# Patient Record
Sex: FEMALE
[De-identification: names, ages, dates, MRNs, and addresses within clinical notes are randomized per-mention and may not be internally consistent; named-entity substitution may affect disease eponyms.]

---

## 2019-02-09 ENCOUNTER — HOSPITAL ENCOUNTER (EMERGENCY)
Dept: HOSPITAL 25 - UCEAST | Age: 19
Discharge: HOME | End: 2019-02-09
Payer: COMMERCIAL

## 2019-02-09 VITALS — SYSTOLIC BLOOD PRESSURE: 108 MMHG | DIASTOLIC BLOOD PRESSURE: 73 MMHG

## 2019-02-09 DIAGNOSIS — Z88.0: ICD-10-CM

## 2019-02-09 DIAGNOSIS — M25.511: Primary | ICD-10-CM

## 2019-02-09 PROCEDURE — 99202 OFFICE O/P NEW SF 15 MIN: CPT

## 2019-02-09 PROCEDURE — G0463 HOSPITAL OUTPT CLINIC VISIT: HCPCS

## 2019-02-09 NOTE — UC
Shoulder Pain HPI





- HPI Summary


HPI Summary: 


PATIENT STATES SHE WAS WOKEN FROM SLEEP LAST NIGHT WITH RIGHT SHOULDER PAIN.  

SHE REPORTS HAVING MULTIPLE EPISODES OF SPONTANEOUS SHOULDER DISLOCATIONS WHILE 

IN HIGH SCHOOL.  MOST RECENT EPISODE WAS 2 YEARS AGO.  STATES HER MOM WOULD 

JUST POP IT BACK INTO PLACE.  SHE THINKS HER SHOULDER IS AGAIN DISLOCATED 

TODAY.  SHE STATES SURGERY WAS RECOMMENDED AT ONE TIME BUT SHE DID NOT FOLLOW-

UP ON THIS.








- History of Current Complaint


Chief Complaint: UCUpperExtremity


Stated Complaint: SHOULDER INJURY


Time Seen by Provider: 02/09/19 17:29


Hx Obtained From: Patient


Hx Last Menstrual Period: Jan 26, 2019


Onset/Duration: Sudden Onset, Lasting Hours, Still Present


Timing: Constant


Severity Initially: Moderate


Severity Currently: Moderate


Location Of Pain: Is Discrete @ - RIGHT SHOULDER


Pain Intensity: 7


Pain Scale Used: 0-10 Numeric


Character: Sharp, Aching


Aggravating Factor(s): Movement


Alleviating Factor(s): Nothing


Associated Signs And Symptoms: Positive: Negative


Related History: Dominant Hand Right





- Allergies/Home Medications


Allergies/Adverse Reactions: 


 Allergies











Allergy/AdvReac Type Severity Reaction Status Date / Time


 


Penicillins Allergy  Difficulty Verified 02/09/19 17:07





   Breathing  











Home Medications: 


 Home Medications





NK [No Home Medications Reported]  02/09/19 [History Confirmed 02/09/19]











PMH/Surg Hx/FS Hx/Imm Hx


Previously Healthy: Yes





- Surgical History


Surgical History: None





- Family History


Known Family History: Positive: Non-Contributory





- Social History


Alcohol Use: Occasionally


Substance Use Type: None


Smoking Status (MU): Never Smoked Tobacco





Review of Systems


All Other Systems Reviewed And Are Negative: Yes


Constitutional: Positive: Negative


Skin: Positive: Negative


Respiratory: Positive: Negative


Cardiovascular: Positive: Negative


Gastrointestinal: Positive: Negative


Musculoskeletal: Positive: Arthralgia, Decreased ROM





Physical Exam


Triage Information Reviewed: Yes


Appearance: Well-Appearing, No Pain Distress, Well-Nourished


Vital Signs: 


 Initial Vital Signs











Temp  98.7 F   02/09/19 17:03


 


Pulse  62   02/09/19 17:03


 


Resp  12   02/09/19 17:03


 


BP  108/73   02/09/19 17:03


 


Pulse Ox  100   02/09/19 17:03











Vital Signs Reviewed: Yes


Eyes: Positive: Conjunctiva Clear


ENT: Positive: Hearing grossly normal


Neck: Positive: Supple


Respiratory: Positive: No respiratory distress, No accessory muscle use


Cardiovascular: Positive: Pulses Normal


Abdomen Description: Positive: Soft


Musculoskeletal: Positive: No Edema, ROM Limited @ - RIGHT SHOULDER ROM LIMITED 

BUT NOT ABSENT, Other: - TTP DIFFUSELY OVER RIGHT SHOULDER BUT MOSTLY OVER 

SCAPULA. NO SHOULDER DISLOCATION.


Neurological: Positive: Alert


Psychological: Positive: Age Appropriate Behavior


Skin: Negative: Rashes





Diagnostics





- Radiology


  ** RIGHT SHOULDER XRAY


Radiology Interpretation Completed By: Radiologist


Summary of Radiographic Findings: FINDINGS SUGGESTIVE OF A MILD HILL-SACHS 

FRACTURE.





Shoulder Course/Dx





- Course


Course Of Treatment: ON EXAMINATION PATIENT'S SHOULDER IS NOT DISLOCATED.  SHE 

STATES THAT WHEN SHE WAS BEING POSITIONED FOR X-RAY SHE HEARD A POP.  IT IS 

POSSIBLE THAT SHE REDUCED HER DISLOCATION AT THIS TIME.  GIVEN HER REPORTED 

HISTORY OF RECURRENT SHOULDER DISLOCATIONS HAVE REFERRED TO ORTHOPEDICS FOR 

FURTHER EVALUATION.  I ALSO RECOMMENDED PHYSICAL THERAPY TO HELP STRENGTHEN THE 

MUSCLES THAT SUPPORT THAT JOINT.  SLING FOR COMFORT.  ADVISED TO DISCUSS 

POSSIBLE CONNECTIVE TISSUE DISORDER WITH HER PCP.


Assessment/Plan: OFFICIAL RADIOLOGY REPORT RETURNED AFTER PT DISCHARGE. 

FINDINGS SUGGESTIVE OF A MILD HILL-SACHS FRACTURE. I CALLED PT AND NOTIFIED HER 

OF THIS FINDING. SHE WILL CALL ORTHO MONDAY MORNING FOR AN APPT AS ADVISED.





- Differential Dx/Diagnosis


Provider Diagnosis: 


 Right shoulder pain








Discharge





- Sign-Out/Discharge


Documenting (check all that apply): Patient Departure


All imaging exams completed and their final reports reviewed: Yes





- Discharge Plan


Condition: Stable


Disposition: HOME


Patient Education Materials:  Shoulder Separation Exercises (GEN), Shoulder 

Pain (ED)


Referrals: 


Lewis Cornejo MD [Medical Doctor] - 1 Week


Additional Instructions: 


AT THE TIME OF EVALUATION YOUR SHOULDER WAS NOT DISLOCATED.  IT'S POSSIBLE THAT 

YOU POPPED IT IN ON YOUR OWN WHILE YOU WERE BEING POSITIONED FOR X-RAY.  WEAR 

THE SLING AS NEEDED FOR COMFORT TO OFFLOAD PRESSURE FROM YOUR SHOULDER JOINT.  

PHYSICAL THERAPY REFERRAL PROVIDED TODAY.  THIS CAN BE QUITE HELPFUL IN 

STRENGTHENING THE MUSCLES THAT STABILIZE YOUR SHOULDER.  GIVEN YOUR HISTORY OF 

RECURRENT SHOULDER DISLOCATIONS I WOULD STRONGLY RECOMMEND YOU FOLLOW-UP WITH 

AN ORTHOPEDIST TO FURTHER DISCUSS TREATMENT.  I WOULD ALSO CONSIDER FOLLOWING 

UP WITH YOUR PRIMARY CARE PHYSICIAN TO DISCUSS EVALUATION FOR A POSSIBLE 

CONNECTIVE TISSUE DISORDER.





GO TO THE EMERGENCY ROOM IF YOU DISLOCATE YOUR SHOULDER AGAIN OR IF YOU DEVELOP 

NUMBNESS, TINGLING OR COLOR CHANGE IN YOUR HAND.








- Billing Disposition and Condition


Condition: STABLE


Disposition: Home

## 2019-02-10 ENCOUNTER — HOSPITAL ENCOUNTER (EMERGENCY)
Dept: HOSPITAL 25 - ED | Age: 19
Discharge: HOME | End: 2019-02-10
Payer: SELF-PAY

## 2019-02-10 VITALS — SYSTOLIC BLOOD PRESSURE: 110 MMHG | DIASTOLIC BLOOD PRESSURE: 66 MMHG

## 2019-02-10 DIAGNOSIS — Z88.0: ICD-10-CM

## 2019-02-10 DIAGNOSIS — S83.105A: Primary | ICD-10-CM
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
## 2019-02-10 PROCEDURE — 99282 EMERGENCY DEPT VISIT SF MDM: CPT

## 2019-02-10 NOTE — ED
Lower Extremity





- HPI Summary


HPI Summary: 


18 year old female presents with left knee dislocation.  She states that it 

took her a half an hour to get the knee back in.  She states is not currently 

dislocated.  She has history of multiple dislocations to her knees and 

shoulder.  She is follow-up with ortho this week as she states she just 

dislocated her shoulder a couple days ago.  She states that when she tries to 

put weight on the knee feels like it give out.  She denies any numbness or 

tingling.





- History of Current Complaint


Chief Complaint: EDExtremityLower


Stated Complaint: LEFT KNEE INJURY


Time Seen by Provider: 02/10/19 23:37


Hx Last Menstrual Period: Jan 26, 2019


Pain Intensity: 7





- Allergies/Home Medications


Allergies/Adverse Reactions: 


 Allergies











Allergy/AdvReac Type Severity Reaction Status Date / Time


 


Penicillins Allergy  Difficulty Verified 02/09/19 17:07





   Breathing  











Home Medications: 


 Home Medications





Naproxen Sodium [Aleve] 2 tab PO Q12HR 02/10/19 [History Confirmed 02/10/19]











PMH/Surg Hx/FS Hx/Imm Hx


Respiratory History: 


   Denies: Hx Asthma


Infectious Disease History: No


Infectious Disease History: 


   Denies: Traveled Outside the [place] in Last 30 Days





- Family History


Known Family History: Positive: Non-Contributory





- Social History


Alcohol Use: Occasionally


Substance Use Type: Reports: None


Smoking Status (MU): Never Smoked Tobacco





Review of Systems


Negative: Fever


Negative: Chest Pain


Negative: Shortness Of Breath


Positive: Myalgia - left knee pain


All Other Systems Reviewed And Are Negative: Yes





Physical Exam


Triage Information Reviewed: Yes


Vital Signs On Initial Exam: 


 Initial Vitals











Temp Pulse Resp BP Pulse Ox


 


 99.0 F   80   16   117/58   95 


 


 02/10/19 21:46  02/10/19 21:46  02/10/19 21:46  02/10/19 21:46  02/10/19 21:46











Vital Signs Reviewed: Yes


Appearance: Positive: Well-Appearing


Skin: Positive: Warm, Dry


Head/Face: Positive: Normal Head/Face Inspection


Eyes: Positive: Normal, Conjunctiva Clear


ENT: Positive: Pharynx normal


Respiratory/Lung Sounds: Positive: Clear to Auscultation, Breath Sounds Present


Cardiovascular: Positive: Normal, RRR


Musculoskeletal: Positive: Strength/ROM Intact - left knee, Other - minimial 

tenderness left patella, good pulses


Neurological: Positive: Normal


Psychiatric: Positive: Normal





Diagnostics





- Vital Signs


 Vital Signs











  Temp Pulse Resp BP Pulse Ox


 


 02/10/19 21:46  99.0 F  80  16  117/58  95














- Laboratory


Lab Statement: Any lab studies that have been ordered have been reviewed, and 

results considered in the medical decision making process.





- Radiology


  ** knee


Radiology Interpretation Completed By: ED Physician


Summary of Radiographic Findings: no fracture





Lower Extremity Course/Dx





- Course


Course Of Treatment: 18 year old female presents with left knee dislocation.  

She states that it took her a half an hour to get the knee back in.  She states 

is not currently dislocated.  She has history of multiple dislocations to her 

knees and shoulder.  She is follow-up with ortho this week as she states she 

just dislocated her shoulder a couple days ago.  She states that when she tries 

to put weight on the knee feels like it give out.  She denies any numbness or 

tingling  On exam tenderness over left patella.  neurovascular intact.  X-ray 

shows no current dislocation.  Gave knee immobilizer.  Told to follow-up with 

orthopedic.  Patient understands agrees with plan.





- Diagnoses


Differential Diagnosis/HQI/PQRI: Positive: Fracture (Closed), Sprain, Strain


Provider Diagnoses: 


 Left knee dislocation








Discharge





- Sign-Out/Discharge


Documenting (check all that apply): Patient Departure


Patient Received Moderate/Deep Sedation with Procedure: No





- Discharge Plan


Condition: Good


Disposition: HOME


Patient Education Materials:  Knee Dislocation (ED)


Referrals: 


No Primary Care Phys,NOPCP [Primary Care Provider] - 


Additional Instructions: 


Use immobilizer as meeded


Ice, elevate, 


Ibuprofen or Tylenol every 6 hours for pain


Follow up with ortho 


Return to ED if develop or any new or worsening symptoms





- Billing Disposition and Condition


Condition: GOOD


Disposition: Home

## 2019-11-04 ENCOUNTER — HOSPITAL ENCOUNTER (EMERGENCY)
Dept: HOSPITAL 25 - UCEAST | Age: 19
Discharge: HOME | End: 2019-11-04
Payer: COMMERCIAL

## 2019-11-04 VITALS — DIASTOLIC BLOOD PRESSURE: 70 MMHG | SYSTOLIC BLOOD PRESSURE: 111 MMHG

## 2019-11-04 DIAGNOSIS — H53.149: ICD-10-CM

## 2019-11-04 DIAGNOSIS — S06.0X0A: ICD-10-CM

## 2019-11-04 DIAGNOSIS — Z88.0: ICD-10-CM
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
## 2019-11-04 DIAGNOSIS — R55: Primary | ICD-10-CM

## 2019-11-04 PROCEDURE — 99202 OFFICE O/P NEW SF 15 MIN: CPT

## 2019-11-04 PROCEDURE — 36415 COLL VENOUS BLD VENIPUNCTURE: CPT

## 2019-11-04 PROCEDURE — 85025 COMPLETE CBC W/AUTO DIFF WBC: CPT

## 2019-11-04 PROCEDURE — G0463 HOSPITAL OUTPT CLINIC VISIT: HCPCS

## 2019-11-04 PROCEDURE — 81003 URINALYSIS AUTO W/O SCOPE: CPT

## 2019-11-04 PROCEDURE — 84702 CHORIONIC GONADOTROPIN TEST: CPT

## 2019-11-04 PROCEDURE — 70450 CT HEAD/BRAIN W/O DYE: CPT

## 2019-11-04 PROCEDURE — 80048 BASIC METABOLIC PNL TOTAL CA: CPT

## 2019-11-04 NOTE — UC
Dizzy HPI


HPI Summary: 





20 yo female got up quickly from toilet bowl this am and passed out stiking her 

left occiput


when she woke up she attempted to get up and passed out again


has a HA (holocranial0


Has photo and phonophobia


no neck pain


no CP/SOB/palpitations





no prior hx of faints





today has felt a little weak and dizzy











- History Of Current Complaint


Chief Complaint: UCHeadInjury


Stated Complaint: HEADACHE FAINTED


Time Seen by Provider: 11/04/19 18:07


Hx Obtained From: Patient


Hx Last Menstrual Period: Jan 26, 2019


Onset/Duration: Sudden Onset, Other


Severity Initially: Severe


Severity Currently: Severe


Pain Intensity: 8


Pain Scale Used: 0-10 Numeric


Character: Lightheaded, Dizzy


Aggravating Factor(s): Nothing


Alleviating Factor(s): Rest


Associated Signs And Symptoms: Positive: Tinnitus - slight, Decreased Oral 

Intake.  Negative: Nausea, Vomiting, Diaphoresis, Chest Pain, SOB, Palpitations

, Unsteady Gait, Visual Changes, Change In Medication, Change In Diet, OTC 

Medications





- Allergies/Home Medications


Allergies/Adverse Reactions: 


 Allergies











Allergy/AdvReac Type Severity Reaction Status Date / Time


 


Penicillins Allergy  Difficulty Verified 02/09/19 17:07





   Breathing  














PMH/Surg Hx/FS Hx/Imm Hx


Previously Healthy: Yes





- Surgical History


Surgical History: None





- Family History


Known Family History: Positive: Non-Contributory





- Social History


Alcohol Use: Occasionally


Substance Use Type: None


Smoking Status (MU): Never Smoked Tobacco





Review of Systems


All Other Systems Reviewed And Are Negative: Yes


Constitutional: Positive: Negative


Skin: Positive: Negative


Eyes: Positive: Photophobia


ENT: Positive: Negative


Respiratory: Positive: Negative


Cardiovascular: Positive: Negative


Gastrointestinal: Positive: Negative


Genitourinary: Positive: Negative


Motor: Positive: Negative


Neurovascular: Positive: Negative


Musculoskeletal: Positive: Negative


Neurological: Positive: Headache


Psychological: Positive: Negative





Physical Exam


Triage Information Reviewed: Yes


Appearance: Well-Appearing, No Pain Distress, Well-Nourished


Vital Signs: 


 Initial Vital Signs











Temp  99.9 F   11/04/19 16:50


 


Pulse  74   11/04/19 16:50


 


Resp  22   11/04/19 16:50


 


BP  117/59   11/04/19 16:50


 


Pulse Ox  100   11/04/19 16:50











Vital Signs Reviewed: Yes


Eyes: Positive: Conjunctiva Clear, Other: - EOMI/PERRL


ENT: Positive: Hearing grossly normal, Uvula midline.  Negative: Nasal 

congestion, Nasal drainage, Tonsillar swelling, Tonsillar exudate, Trismus, 

Muffled voice, Hoarse voice


Dental Exam: Normal


Neck: Positive: Supple, Nontender, No Lymphadenopathy


Respiratory: Positive: Lungs clear, Normal breath sounds, No respiratory 

distress, No accessory muscle use


Cardiovascular: Positive: RRR, No Murmur


Musculoskeletal: Positive: ROM Intact, No Edema


Neurological Exam: Normal


Neurological: Positive: Alert, Other: - cn2-12 intact, GCS 15/15, strength 5/5/

, (-) Rhomberg, normal gait, brisk and symmetrical knee jerks


Psychological Exam: Normal


Skin Exam: Normal





Diagnostics





- Radiology


  ** No standard instances


Radiology Interpretation Completed By: Radiologist


Summary of Radiographic Findings: CT brain: negative





- EKG


Cardiac Rate: NL


Cardiac Rhythm: Sinus: Normal


Ectopy: None


ST Segment: Normal





Re-Evaluation





- Re-Evaluation


  ** First Eval


Re-Evaluation Time: 19:46


Change: Improved - headache decreased-ambulating without difficulty





Dizzy Course/Dx





- Differential Dx/Diagnosis


Provider Diagnosis: 


 Syncope, Concussion








Discharge ED





- Sign-Out/Discharge


Documenting (check all that apply): Patient Departure


All imaging exams completed and their final reports reviewed: Yes





- Discharge Plan


Condition: Stable


Disposition: HOME


Patient Education Materials:  Concussion (ED), Syncope (DC)


Referrals: 


No Primary Care Phys,NOPCP [Primary Care Provider] - 


[facility] [place] PHYSICIAN REFERRAL [Outside] - 1 Week (if not better)


Additional Instructions: 


rest


ice


tylenol 





ekg and CT normal





blood work pending





recheck for new or worsening symptoms











- Billing Disposition and Condition


Condition: STABLE


Disposition: Home

## 2019-11-05 LAB
ANION GAP SERPL CALC-SCNC: 5 MMOL/L (ref 2–11)
BASOPHILS # BLD AUTO: 0 10^3/UL (ref 0–0.2)
BUN SERPL-MCNC: 16 MG/DL (ref 6–24)
BUN/CREAT SERPL: 22.9 (ref 8–20)
CALCIUM SERPL-MCNC: 9.4 MG/DL (ref 8.6–10.3)
CHLORIDE SERPL-SCNC: 105 MMOL/L (ref 101–111)
EOSINOPHIL # BLD AUTO: 0.1 10^3/UL (ref 0–0.6)
GLUCOSE SERPL-MCNC: 83 MG/DL (ref 70–100)
HCO3 SERPL-SCNC: 28 MMOL/L (ref 22–32)
HCT VFR BLD AUTO: 39 % (ref 35–47)
HGB BLD-MCNC: 13 G/DL (ref 12–16)
LYMPHOCYTES # BLD AUTO: 1.4 10^3/UL (ref 1–4.8)
MCH RBC QN AUTO: 28 PG (ref 27–31)
MCHC RBC AUTO-ENTMCNC: 34 G/DL (ref 31–36)
MCV RBC AUTO: 82 FL (ref 80–97)
MONOCYTES # BLD AUTO: 1 10^3/UL (ref 0–0.8)
NEUTROPHILS # BLD AUTO: 4.2 10^3/UL (ref 1.5–7.7)
NRBC # BLD AUTO: 0 10^3/UL
NRBC BLD QL AUTO: 0
PLATELET # BLD AUTO: 246 10^3/UL (ref 150–450)
POTASSIUM SERPL-SCNC: 3.9 MMOL/L (ref 3.5–5)
RBC # BLD AUTO: 4.7 10^6 /UL (ref 3.7–4.87)
SODIUM SERPL-SCNC: 138 MMOL/L (ref 135–145)
WBC # BLD AUTO: 6.7 10^3/UL (ref 3.5–10.8)

## 2019-11-05 NOTE — UC
- Progress Note


Progress Note: 





labs wnl, follow up with PCP for further work up.  -Margi Colbert PAC 





Course/Dx





- Diagnoses


Provider Diagnoses: 


 Syncope, Concussion








Discharge ED





- Sign-Out/Discharge


Documenting (check all that apply): Patient Departure


All imaging exams completed and their final reports reviewed: Yes





- Discharge Plan


Condition: Stable


Disposition: HOME


Patient Education Materials:  Syncope (DC), Concussion (ED)


Forms:  *School Release


Referrals: 


[facility] [place] PHYSICIAN REFERRAL [Outside] - 1 Week (if not better)


No Primary Care Phys,NOPCP [Primary Care Provider] - 


Additional Instructions: 


rest


ice


tylenol 





ekg and CT normal





blood work pending





recheck for new or worsening symptoms











- Billing Disposition and Condition


Condition: STABLE


Disposition: Home

## 2019-11-15 ENCOUNTER — HOSPITAL ENCOUNTER (EMERGENCY)
Dept: HOSPITAL 25 - UCEAST | Age: 19
Discharge: HOME HEALTH | End: 2019-11-15
Payer: COMMERCIAL

## 2019-11-15 ENCOUNTER — HOSPITAL ENCOUNTER (EMERGENCY)
Dept: HOSPITAL 25 - ED | Age: 19
LOS: 1 days | Discharge: HOME | End: 2019-11-16
Payer: SELF-PAY

## 2019-11-15 VITALS — SYSTOLIC BLOOD PRESSURE: 109 MMHG | DIASTOLIC BLOOD PRESSURE: 71 MMHG

## 2019-11-15 DIAGNOSIS — R50.9: ICD-10-CM

## 2019-11-15 DIAGNOSIS — Z88.0: ICD-10-CM

## 2019-11-15 DIAGNOSIS — J06.9: ICD-10-CM

## 2019-11-15 DIAGNOSIS — R11.0: ICD-10-CM

## 2019-11-15 DIAGNOSIS — R10.9: Primary | ICD-10-CM

## 2019-11-15 DIAGNOSIS — M54.2: ICD-10-CM

## 2019-11-15 DIAGNOSIS — J02.9: ICD-10-CM

## 2019-11-15 DIAGNOSIS — F07.81: ICD-10-CM

## 2019-11-15 DIAGNOSIS — R51: ICD-10-CM

## 2019-11-15 DIAGNOSIS — Z88.1: ICD-10-CM

## 2019-11-15 DIAGNOSIS — R10.84: Primary | ICD-10-CM

## 2019-11-15 DIAGNOSIS — J34.89: ICD-10-CM

## 2019-11-15 PROCEDURE — 96374 THER/PROPH/DIAG INJ IV PUSH: CPT

## 2019-11-15 PROCEDURE — 86140 C-REACTIVE PROTEIN: CPT

## 2019-11-15 PROCEDURE — 87651 STREP A DNA AMP PROBE: CPT

## 2019-11-15 PROCEDURE — 84702 CHORIONIC GONADOTROPIN TEST: CPT

## 2019-11-15 PROCEDURE — 99212 OFFICE O/P EST SF 10 MIN: CPT

## 2019-11-15 PROCEDURE — 81003 URINALYSIS AUTO W/O SCOPE: CPT

## 2019-11-15 PROCEDURE — 85025 COMPLETE CBC W/AUTO DIFF WBC: CPT

## 2019-11-15 PROCEDURE — 83605 ASSAY OF LACTIC ACID: CPT

## 2019-11-15 PROCEDURE — 80053 COMPREHEN METABOLIC PANEL: CPT

## 2019-11-15 PROCEDURE — 36415 COLL VENOUS BLD VENIPUNCTURE: CPT

## 2019-11-15 PROCEDURE — 71046 X-RAY EXAM CHEST 2 VIEWS: CPT

## 2019-11-15 PROCEDURE — 96375 TX/PRO/DX INJ NEW DRUG ADDON: CPT

## 2019-11-15 PROCEDURE — 96361 HYDRATE IV INFUSION ADD-ON: CPT

## 2019-11-15 PROCEDURE — 99283 EMERGENCY DEPT VISIT LOW MDM: CPT

## 2019-11-15 PROCEDURE — G0463 HOSPITAL OUTPT CLINIC VISIT: HCPCS

## 2019-11-15 NOTE — UC
Abdominal Pain Female HPI





- HPI Summary


HPI Summary: 


19-year-old female comes in with a chief complaint of abdominal pain.  This 

started about an hour ago.  It's diffuse.  It's an 8 out of 10.  She does have 

some nausea.  She has had a fever.  2 days ago patient woke up with a runny 

nose and  Diffuse generalized headache.  She also has a sore throat. Today she 

went to her [facility] clinic and was diagnosed with sinusitis and started 

on a azithromycin.  She took 2 pills of a azithromycin about 3 hours ago.  The 

abdominal pain started 1 hour ago.  Denies any urinary symptoms or change in 

bowel.


On November 4, 2019 patient struck her head and had a negative head CT.  She 

had a diffuse generalized headache until 5 days ago.  Since the injury the 

headache gradually dissipated until it was gone 5 days ago.  For 2 days she was 

completely asymptomatic of a headache.  Today she has a headache that's an 8 

out of 10 and diffuse.  No photophobia.  She does have some neck pain.





- History of Current Complaint


Chief Complaint: UCAbdominalPain


Stated Complaint: STOMACH PAIN, HEADACHE


Time Seen by Provider: 11/15/19 20:12


Hx Last Menstrual Period: 11/10/19


Pain Intensity: 8


Allergies/Adverse Reactions: 


 Allergies











Allergy/AdvReac Type Severity Reaction Status Date / Time


 


amoxicillin Allergy  Difficulty Verified 11/15/19 20:19





   Breathing  


 


clavulanic acid Allergy  Difficulty Verified 11/15/19 20:19





[From Augmentin]   Breathing  


 


Penicillins Allergy  Difficulty Verified 02/09/19 17:07





   Breathing  











Home Medications: 


 Home Medications





Azithromyxin KAREL (NF) [Z-Karel (Zithromax) 250 mg tabs #6] 2 tab PO .TODAY, THEN 

1 DAILY 11/15/19 [History Confirmed 11/15/19]


Triamcinolone NASAL SPRAY* [Nasacort AQ Nasal [place]*] 1 puff NASAL DAILY WITH 

MEAL 11/15/19 [History Confirmed 11/15/19]











PMH/Surg Hx/FS Hx/Imm Hx


Previously Healthy: Yes


Other Neurological History: CONCUSSION





- Surgical History


Surgical History: None





- Family History


Known Family History: Positive: Non-Contributory





- Social History


Alcohol Use: None


Substance Use Type: None


Smoking Status (MU): Never Smoked Tobacco





Review of Systems


All Other Systems Reviewed And Are Negative: Yes


Constitutional: Positive: Fever, Other - SEE HPI


Skin: Positive: Negative


Eyes: Positive: Negative


ENT: Positive: Sore Throat, Nasal Discharge, Sinus Congestion


Respiratory: Positive: Negative


Cardiovascular: Positive: Negative


Gastrointestinal: Positive: Abdominal Pain, Nausea


Genitourinary: Positive: Negative


Motor: Positive: Negative


Neurovascular: Positive: Negative


Musculoskeletal: Positive: Negative


Neurological: Positive: Negative


Psychological: Positive: Negative


Is Patient Immunocompromised?: No





Physical Exam


Triage Information Reviewed: Yes


Appearance: Well-Nourished, Ill-Appearing - MILD, Pain Distress - MILD


Vital Signs: 


 Initial Vital Signs











Temp  100.0 F   11/15/19 20:10


 


Pulse  77   11/15/19 20:10


 


Resp  18   11/15/19 20:10


 


BP  109/71   11/15/19 20:10


 


Pulse Ox  98   11/15/19 20:10











Vital Signs Reviewed: Yes


Eye Exam: Normal


Eyes: Positive: Conjunctiva Clear


ENT: Positive: Pharyngeal erythema, Nasal congestion, TMs normal - No 

hemotympanum


Neck: Positive: Supple


Respiratory: Positive: Lungs clear, Normal breath sounds, No respiratory 

distress


Cardiovascular: Positive: RRR


Musculoskeletal: Positive: Strength Intact, ROM Intact


Neurological: Positive: Alert, Muscle Tone Normal


Psychological: Positive: Age Appropriate Behavior


Skin Exam: Normal





Abd Pain Female Course/Dx





- Course


Course Of Treatment: 


In clinic I gave the patient Zofran 4 mg ODT.  Her abdominal pain decreased to 

a 6 out of 10 from an 8 out of 10.  Who is worst around the umbilicus but also 

the right lower quadrant.,  I am Unable to completely rule out appendicitis 

here in clinic.  Patient also has a diffuse headache low-grade fever posterior 

neck stiffness and I cannot rule out meningitis.  Patient first came here to 

clinic she was in moderate to severe pain.  Her pain has decreased however I 

recommended going to the emergency department for further evaluation and care.





- Differential Dx/Diagnosis


Provider Diagnosis: 


 Abdominal pain, Headache, Fever








Discharge ED





- Sign-Out/Discharge


Documenting (check all that apply): Patient Departure


All imaging exams completed and their final reports reviewed: No Studies





- Discharge Plan


Condition: Stable


Disposition: HOME-RECOMMEND TO ED


Patient Education Materials:  Acute Abdominal Pain (ED)


Referrals: 


[facility] @  [Outside]


Additional Instructions: 


GO DIRECTLY TO THE EMERGENCY DEPARTMENT FOR FURTHER EVALUATION OF YOUR 

ABDOMINAL PAIN, FEVER AND HEADACHE.








- Billing Disposition and Condition


Condition: STABLE


Disposition: Home-Recommend to ED

## 2019-11-16 VITALS — SYSTOLIC BLOOD PRESSURE: 128 MMHG | DIASTOLIC BLOOD PRESSURE: 86 MMHG

## 2019-11-16 LAB
ALBUMIN SERPL BCG-MCNC: 4.3 G/DL (ref 3.2–5.2)
ALBUMIN/GLOB SERPL: 1 {RATIO} (ref 1–3)
ALP SERPL-CCNC: 66 U/L (ref 34–104)
ALT SERPL W P-5'-P-CCNC: 9 U/L (ref 7–52)
ANION GAP SERPL CALC-SCNC: 7 MMOL/L (ref 2–11)
AST SERPL-CCNC: (no result) U/L (ref 13–39)
AST SERPL-CCNC: 14 U/L (ref 13–39)
BASOPHILS # BLD AUTO: 0 10^3/UL (ref 0–0.2)
BUN SERPL-MCNC: 10 MG/DL (ref 6–24)
BUN/CREAT SERPL: 16.1 (ref 8–20)
CALCIUM SERPL-MCNC: 9.3 MG/DL (ref 8.6–10.3)
CHLORIDE SERPL-SCNC: 104 MMOL/L (ref 101–111)
EOSINOPHIL # BLD AUTO: 0.1 10^3/UL (ref 0–0.6)
GLOBULIN SER CALC-MCNC: 4.1 G/DL (ref 2–4)
GLUCOSE SERPL-MCNC: 89 MG/DL (ref 70–100)
HCG SERPL QL: < 0.6 MIU/ML
HCO3 SERPL-SCNC: 24 MMOL/L (ref 22–32)
HCT VFR BLD AUTO: 37 % (ref 35–47)
HGB BLD-MCNC: 12.1 G/DL (ref 12–16)
LYMPHOCYTES # BLD AUTO: 1.6 10^3/UL (ref 1–4.8)
MCH RBC QN AUTO: 27 PG (ref 27–31)
MCHC RBC AUTO-ENTMCNC: 33 G/DL (ref 31–36)
MCV RBC AUTO: 82 FL (ref 80–97)
MONOCYTES # BLD AUTO: 1 10^3/UL (ref 0–0.8)
NEUTROPHILS # BLD AUTO: 9.1 10^3/UL (ref 1.5–7.7)
NRBC # BLD AUTO: 0 10^3/UL
NRBC BLD QL AUTO: 0.1
PLATELET # BLD AUTO: 237 10^3/UL (ref 150–450)
POTASSIUM SERPL-SCNC: (no result) MMOL/L (ref 3.5–5)
POTASSIUM SERPL-SCNC: 3.6 MMOL/L (ref 3.5–5)
PROT SERPL-MCNC: 8.4 G/DL (ref 6.4–8.9)
RBC # BLD AUTO: 4.45 10^6 /UL (ref 3.7–4.87)
SODIUM SERPL-SCNC: 135 MMOL/L (ref 135–145)
WBC # BLD AUTO: 11.9 10^3/UL (ref 3.5–10.8)

## 2019-11-16 RX ADMIN — SODIUM CHLORIDE ONE MLS/HR: 900 IRRIGANT IRRIGATION at 00:49
